# Patient Record
Sex: MALE | Race: WHITE | NOT HISPANIC OR LATINO | URBAN - METROPOLITAN AREA
[De-identification: names, ages, dates, MRNs, and addresses within clinical notes are randomized per-mention and may not be internally consistent; named-entity substitution may affect disease eponyms.]

---

## 2017-10-29 ENCOUNTER — EMERGENCY (EMERGENCY)
Facility: HOSPITAL | Age: 62
LOS: 1 days | Discharge: HOME | End: 2017-10-29
Attending: EMERGENCY MEDICINE
Payer: MEDICARE

## 2017-10-29 VITALS
HEIGHT: 75 IN | DIASTOLIC BLOOD PRESSURE: 74 MMHG | HEART RATE: 63 BPM | RESPIRATION RATE: 16 BRPM | TEMPERATURE: 98 F | OXYGEN SATURATION: 99 % | SYSTOLIC BLOOD PRESSURE: 137 MMHG | WEIGHT: 240.08 LBS

## 2017-10-29 PROCEDURE — 99284 EMERGENCY DEPT VISIT MOD MDM: CPT | Mod: 25

## 2017-10-29 PROCEDURE — 73110 X-RAY EXAM OF WRIST: CPT

## 2017-10-29 PROCEDURE — 73130 X-RAY EXAM OF HAND: CPT

## 2017-10-29 PROCEDURE — 73110 X-RAY EXAM OF WRIST: CPT | Mod: 26,RT

## 2017-10-29 PROCEDURE — 73130 X-RAY EXAM OF HAND: CPT | Mod: 26,RT

## 2017-10-29 RX ORDER — OXYCODONE AND ACETAMINOPHEN 5; 325 MG/1; MG/1
1 TABLET ORAL ONCE
Qty: 0 | Refills: 0 | Status: DISCONTINUED | OUTPATIENT
Start: 2017-10-29 | End: 2017-10-29

## 2017-10-29 RX ORDER — IBUPROFEN 200 MG
1 TABLET ORAL
Qty: 20 | Refills: 0 | OUTPATIENT
Start: 2017-10-29

## 2017-10-29 RX ORDER — IBUPROFEN 200 MG
600 TABLET ORAL ONCE
Qty: 0 | Refills: 0 | Status: COMPLETED | OUTPATIENT
Start: 2017-10-29 | End: 2017-10-29

## 2017-10-29 RX ADMIN — OXYCODONE AND ACETAMINOPHEN 1 TABLET(S): 5; 325 TABLET ORAL at 04:14

## 2017-10-29 RX ADMIN — OXYCODONE AND ACETAMINOPHEN 1 TABLET(S): 5; 325 TABLET ORAL at 02:07

## 2017-10-29 RX ADMIN — Medication 600 MILLIGRAM(S): at 02:06

## 2017-10-29 RX ADMIN — Medication 600 MILLIGRAM(S): at 04:14

## 2017-10-29 NOTE — ED ADULT TRIAGE NOTE - CHIEF COMPLAINT QUOTE
I woke up this morning with severe pain in my right hand , pins and needles sensations , I cant move my right hand

## 2017-10-29 NOTE — ED PROCEDURE NOTE - CPROC ED POST PROC CARE GUIDE1
Verbal/written post procedure instructions were given to patient/caregiver./Keep the cast/splint/dressing clean and dry./Elevate the injured extremity as instructed./Instructed patient/caregiver to follow-up with primary care physician.

## 2017-10-29 NOTE — ED PROVIDER NOTE - UPPER EXTREMITY EXAM, RIGHT
tenderness over R wrist area but no deformity; capillary refill intact distally; decreased ROM secondary to pain only but no apparent weakness

## 2017-10-29 NOTE — ED PROVIDER NOTE - OBJECTIVE STATEMENT
61 y/o M pt with PMHx of Hypothyroidism and no significant PSHx presents to ED c/o R hand pain with associated R wrist pain and tingling to the R fingers since today. Pt reports sx's were intermittent and worsened throughout the day. Pt also reports associated difficulty moving R hand at the wrist (R). Pt denies weakness, R elbow pain, R shoulder pain, neck pain, or any other complaints. Pt also denies recent trauma. Allergies: Penicillin (rash).

## 2017-10-29 NOTE — ED PROVIDER NOTE - MEDICAL DECISION MAKING DETAILS
61 y/o M pt presents with R hand and R wrist pain. Will X-Ray and likely d/c with splint. Will also provide pain control.

## 2017-11-03 DIAGNOSIS — G56.01 CARPAL TUNNEL SYNDROME, RIGHT UPPER LIMB: ICD-10-CM

## 2017-11-03 DIAGNOSIS — E03.9 HYPOTHYROIDISM, UNSPECIFIED: ICD-10-CM

## 2017-11-03 DIAGNOSIS — Z88.0 ALLERGY STATUS TO PENICILLIN: ICD-10-CM

## 2019-05-19 ENCOUNTER — TRANSCRIPTION ENCOUNTER (OUTPATIENT)
Age: 64
End: 2019-05-19

## 2019-07-12 ENCOUNTER — TRANSCRIPTION ENCOUNTER (OUTPATIENT)
Age: 64
End: 2019-07-12

## 2020-02-11 PROBLEM — E03.9 HYPOTHYROIDISM, UNSPECIFIED: Chronic | Status: ACTIVE | Noted: 2017-10-29

## 2020-05-28 ENCOUNTER — APPOINTMENT (OUTPATIENT)
Dept: RHEUMATOLOGY | Facility: CLINIC | Age: 65
End: 2020-05-28

## 2022-01-11 NOTE — ED ADULT NURSE NOTE - NS ED NURSE IV DC DT
Blood pressure remains well controlled as of 9/21 office.  Patient is stable on doxazosin as well as Procardia.---> Nephrology added low-dose furosemide last year, but patient had to lower this to as needed due to orthostatic hypotension as of 1/22.  His blood pressure is up just today, it stable at home, no changes made.   29-Oct-2017 04:14

## 2023-05-08 ENCOUNTER — NON-APPOINTMENT (OUTPATIENT)
Age: 68
End: 2023-05-08